# Patient Record
Sex: FEMALE | Race: ASIAN
[De-identification: names, ages, dates, MRNs, and addresses within clinical notes are randomized per-mention and may not be internally consistent; named-entity substitution may affect disease eponyms.]

---

## 2024-03-29 PROBLEM — Z00.00 ENCOUNTER FOR PREVENTIVE HEALTH EXAMINATION: Status: ACTIVE | Noted: 2024-03-29

## 2024-04-01 ENCOUNTER — APPOINTMENT (OUTPATIENT)
Dept: OTOLARYNGOLOGY | Facility: CLINIC | Age: 89
End: 2024-04-01
Payer: MEDICARE

## 2024-04-01 VITALS
SYSTOLIC BLOOD PRESSURE: 112 MMHG | OXYGEN SATURATION: 95 % | DIASTOLIC BLOOD PRESSURE: 66 MMHG | HEART RATE: 89 BPM | WEIGHT: 106 LBS | HEIGHT: 62 IN | TEMPERATURE: 98 F | BODY MASS INDEX: 19.51 KG/M2

## 2024-04-01 DIAGNOSIS — Z86.39 PERSONAL HISTORY OF OTHER ENDOCRINE, NUTRITIONAL AND METABOLIC DISEASE: ICD-10-CM

## 2024-04-01 DIAGNOSIS — Z80.9 FAMILY HISTORY OF MALIGNANT NEOPLASM, UNSPECIFIED: ICD-10-CM

## 2024-04-01 DIAGNOSIS — Z78.9 OTHER SPECIFIED HEALTH STATUS: ICD-10-CM

## 2024-04-01 PROCEDURE — 99202 OFFICE O/P NEW SF 15 MIN: CPT

## 2024-04-01 NOTE — ASSESSMENT
[FreeTextEntry1] : hearing loss and cerumen impaction son assured it was not due to eye drops or prednisolone debrox drops AD and explained how to use rtc 10 d to reattempt removal.

## 2024-04-01 NOTE — PHYSICAL EXAM
[de-identified] : b cerumen removal attempted - l removed completely; r could not tolerate.  [Normal] : assessment of respiratory effort is normal [de-identified] : gait steady

## 2024-04-01 NOTE — HISTORY OF PRESENT ILLNESS
[de-identified] : 87 yo f here with her son. He translates for her. Beginning 1/2024 she had numerous r eye surgeries and some difficulty. had prednisolone. 2/2024 vision improved-  2/26 - told elevated iop - numerous eye drops to lower pressure last 2 weeks used combigan and coughed for 2 weeks and slept 4 weeks ago son noticed her hearing was worse and could not hear his whispers. 2-3 weeks ago she could not hear phone. Happened again a few d later. pmd told son wax rec to see ear physician - was referred to Dr Abad Otoole but he did not have availability so she came here. No fh or sh relevant to cc.

## 2024-04-11 ENCOUNTER — APPOINTMENT (OUTPATIENT)
Dept: OTOLARYNGOLOGY | Facility: CLINIC | Age: 89
End: 2024-04-11
Payer: MEDICARE

## 2024-04-11 VITALS
TEMPERATURE: 97.3 F | DIASTOLIC BLOOD PRESSURE: 79 MMHG | HEIGHT: 62 IN | SYSTOLIC BLOOD PRESSURE: 128 MMHG | HEART RATE: 90 BPM | WEIGHT: 106 LBS | BODY MASS INDEX: 19.51 KG/M2

## 2024-04-11 DIAGNOSIS — H90.3 SENSORINEURAL HEARING LOSS, BILATERAL: ICD-10-CM

## 2024-04-11 DIAGNOSIS — H61.23 IMPACTED CERUMEN, BILATERAL: ICD-10-CM

## 2024-04-11 DIAGNOSIS — H69.90 UNSPECIFIED EUSTACHIAN TUBE DISORDER, UNSPECIFIED EAR: ICD-10-CM

## 2024-04-11 PROCEDURE — G0268 REMOVAL OF IMPACTED WAX MD: CPT

## 2024-04-11 PROCEDURE — 92567 TYMPANOMETRY: CPT

## 2024-04-11 PROCEDURE — 31231 NASAL ENDOSCOPY DX: CPT

## 2024-04-11 PROCEDURE — 92557 COMPREHENSIVE HEARING TEST: CPT | Mod: 52

## 2024-04-11 PROCEDURE — 99213 OFFICE O/P EST LOW 20 MIN: CPT | Mod: 25

## 2024-04-11 NOTE — PROCEDURE
[Cerumen Impaction] : Cerumen Impaction [Same] : same as the Pre Op Dx. [] : Removal of Cerumen [Posterior Lesion] : posterior lesion [Anterior rhinoscopy insufficient to account for symptoms] : anterior rhinoscopy insufficient to account for symptoms [Flexible Endoscope] : examined with the flexible endoscope [Serial Number: ___] : Serial Number: [unfilled] [Normal] : the paranasal sinuses had no abnormalities [FreeTextEntry6] : done due to etd - flat tymp r/o npx mass  clear Mucosa: b nl  Mucus:b nl Polyps:b nl Inferior turbinate:b nl Middle turbinate:b nl Superior turbinate:b nl Inferior Meatus:b nl Middle Meatus:b nl Superior meatus:b nl Sphenoethmoidal recess:b nl  [FreeTextEntry5] : b copious cerumen removed atraumatically with suction, hook, and alligator forceps r>l b TMs nl

## 2024-04-11 NOTE — HISTORY OF PRESENT ILLNESS
[de-identified] : 10 day follow up visit for this 87 y/o F here with her son who helps translate for her with hearing loss and cerumen impaction. She used Debrox ear drops and is here for complete removal and to assess her hearing.

## 2024-04-11 NOTE — PHYSICAL EXAM
[Normal] : external ears are normal bilaterally [de-identified] : b r>l copious cerumen removed atraumatically with suction, hook, and alligator forceps r>l b TMs nl  [de-identified] : gait steady

## 2024-04-11 NOTE — ASSESSMENT
[FreeTextEntry1] : 1. b cerumen impaction  -cerumen removed -ears felt better 2. b snhl - showed b snhl -results reviewed with pt  -recommended hae- she prefers to hold off 3. reassured no npx mass in this pt with etd RTC in 1 yr with rpt ; call sooner for any issues

## 2025-04-10 ENCOUNTER — APPOINTMENT (OUTPATIENT)
Dept: OTOLARYNGOLOGY | Facility: CLINIC | Age: 89
End: 2025-04-10